# Patient Record
Sex: FEMALE | Race: WHITE
[De-identification: names, ages, dates, MRNs, and addresses within clinical notes are randomized per-mention and may not be internally consistent; named-entity substitution may affect disease eponyms.]

---

## 2017-08-07 ENCOUNTER — HOSPITAL ENCOUNTER (OUTPATIENT)
Dept: HOSPITAL 45 - C.LABPVFM | Age: 52
Discharge: HOME | End: 2017-08-07
Attending: PSYCHIATRY & NEUROLOGY
Payer: COMMERCIAL

## 2017-08-07 DIAGNOSIS — Z51.81: Primary | ICD-10-CM

## 2017-08-07 DIAGNOSIS — Z79.899: ICD-10-CM

## 2017-08-07 LAB
CHOLEST/HDLC SERPL: 2.5 {RATIO}
GLUCOSE UR QL: 81 MG/DL
KETONES UR QL STRIP: 114 MG/DL
NITRITE UR QL STRIP: 55 MG/DL (ref 0–150)
PH UR: 206 MG/DL (ref 0–200)
VERY LOW DENSITY LIPOPROT CALC: 11 MG/DL

## 2017-08-15 ENCOUNTER — HOSPITAL ENCOUNTER (OUTPATIENT)
Dept: HOSPITAL 45 - C.PAPS | Age: 52
Discharge: HOME | End: 2017-08-15
Attending: OBSTETRICS & GYNECOLOGY
Payer: COMMERCIAL

## 2017-08-15 DIAGNOSIS — Z12.4: Primary | ICD-10-CM

## 2017-09-18 ENCOUNTER — HOSPITAL ENCOUNTER (OUTPATIENT)
Dept: HOSPITAL 45 - C.MAMM | Age: 52
Discharge: HOME | End: 2017-09-18
Attending: OBSTETRICS & GYNECOLOGY
Payer: COMMERCIAL

## 2017-09-18 DIAGNOSIS — Z12.31: Primary | ICD-10-CM

## 2017-09-18 NOTE — MAMMOGRAPHY REPORT
BILATERAL DIGITAL SCREENING MAMMOGRAM TOMOSYNTHESIS WITH CAD: 9/18/2017

CLINICAL HISTORY: Routine screening.  





TECHNIQUE:  Breast tomosynthesis in addition to standard 2D mammography was performed. Current study 
was also evaluated with a Computer Aided Detection (CAD) system.  



COMPARISON: Comparison is made to exams dated:  9/12/2016 mammogram, 9/8/2015 mammogram, 9/2/2014 dick
mogram, 10/26/2009 mammogram - [facility], 7/26/2007, and 3/4/2005 mammogram - [facility].   



BREAST COMPOSITION:  The tissue of both breasts is heterogeneously dense, which may obscure small mas
ses.  



FINDINGS:  The parenchymal pattern is unchanged. No developing mass, architectural distortion or clus
ter of suspicious microcalcifications is seen in either breast.  





IMPRESSION:  ACR BI-RADS CATEGORY 2: BENIGN

There is no mammographic evidence of malignancy. A 1 year screening mammogram is recommended.  The pa
tient will receive written notification of the results.  





Approximately 10% of breast cancers are not detected with mammography. A negative mammographic report
 should not delay biopsy if a clinically suggestive mass is present.



Rena Clemons M.D.          

ay/:9/18/2017 15:34:15  



Imaging Technologist: Teresa KAPOOR(R)(M), [facility]

letter sent: Normal 1/2  

BI-RADS Code: ACR BI-RADS Category 2: Benign

## 2018-02-16 ENCOUNTER — HOSPITAL ENCOUNTER (OUTPATIENT)
Dept: HOSPITAL 45 - C.LAB | Age: 53
End: 2018-02-16
Attending: PSYCHIATRY & NEUROLOGY
Payer: COMMERCIAL

## 2018-02-16 DIAGNOSIS — Z79.899: Primary | ICD-10-CM

## 2018-02-16 LAB
BUN SERPL-MCNC: 14 MG/DL (ref 7–18)
CALCIUM SERPL-MCNC: 9.7 MG/DL (ref 8.5–10.1)
CO2 SERPL-SCNC: 29 MMOL/L (ref 21–32)
CREAT SERPL-MCNC: 0.92 MG/DL (ref 0.6–1.2)
GLUCOSE SERPL-MCNC: 81 MG/DL (ref 70–99)
KETONES UR QL STRIP: 117 MG/DL
PH UR: 191 MG/DL (ref 0–200)
POTASSIUM SERPL-SCNC: 4.3 MMOL/L (ref 3.5–5.1)
SODIUM SERPL-SCNC: 138 MMOL/L (ref 136–145)

## 2018-04-09 ENCOUNTER — HOSPITAL ENCOUNTER (OUTPATIENT)
Dept: HOSPITAL 45 - C.LABPVFM | Age: 53
Discharge: HOME | End: 2018-04-09
Attending: PSYCHIATRY & NEUROLOGY
Payer: COMMERCIAL

## 2018-04-09 DIAGNOSIS — Z79.899: ICD-10-CM

## 2018-04-09 DIAGNOSIS — Z51.81: Primary | ICD-10-CM
